# Patient Record
Sex: FEMALE | Race: BLACK OR AFRICAN AMERICAN | URBAN - METROPOLITAN AREA
[De-identification: names, ages, dates, MRNs, and addresses within clinical notes are randomized per-mention and may not be internally consistent; named-entity substitution may affect disease eponyms.]

---

## 2017-11-16 ENCOUNTER — RESULT FOLLOW UP (OUTPATIENT)
Dept: OBGYN | Facility: OTHER | Age: 32
End: 2017-11-16

## 2017-11-16 ENCOUNTER — PRENATAL OFFICE VISIT (OUTPATIENT)
Dept: OBGYN | Facility: OTHER | Age: 32
End: 2017-11-16

## 2017-11-16 VITALS
HEART RATE: 84 BPM | BODY MASS INDEX: 48.19 KG/M2 | HEIGHT: 65 IN | WEIGHT: 289.25 LBS | SYSTOLIC BLOOD PRESSURE: 112 MMHG | DIASTOLIC BLOOD PRESSURE: 68 MMHG

## 2017-11-16 DIAGNOSIS — O30.042 DICHORIONIC DIAMNIOTIC TWIN PREGNANCY IN SECOND TRIMESTER: ICD-10-CM

## 2017-11-16 DIAGNOSIS — Z23 NEED FOR TDAP VACCINATION: ICD-10-CM

## 2017-11-16 DIAGNOSIS — O09.92 SUPERVISION OF HIGH RISK PREGNANCY IN SECOND TRIMESTER: Primary | ICD-10-CM

## 2017-11-16 DIAGNOSIS — R87.810 CERVICAL HIGH RISK HPV (HUMAN PAPILLOMAVIRUS) TEST POSITIVE: ICD-10-CM

## 2017-11-16 DIAGNOSIS — Z12.4 SCREENING FOR MALIGNANT NEOPLASM OF CERVIX: ICD-10-CM

## 2017-11-16 DIAGNOSIS — Z98.891 HISTORY OF 3 CESAREAN SECTIONS: ICD-10-CM

## 2017-11-16 LAB
ABO + RH BLD: NORMAL
ABO + RH BLD: NORMAL
ALBUMIN UR-MCNC: NEGATIVE MG/DL
APPEARANCE UR: ABNORMAL
BILIRUB UR QL STRIP: NEGATIVE
BLD GP AB SCN SERPL QL: NORMAL
BLOOD BANK CMNT PATIENT-IMP: NORMAL
COLOR UR AUTO: YELLOW
ERYTHROCYTE [DISTWIDTH] IN BLOOD BY AUTOMATED COUNT: 15.1 % (ref 10–15)
GLUCOSE UR STRIP-MCNC: NEGATIVE MG/DL
HCT VFR BLD AUTO: 32.6 % (ref 35–47)
HGB BLD-MCNC: 10.9 G/DL (ref 11.7–15.7)
HGB UR QL STRIP: ABNORMAL
KETONES UR STRIP-MCNC: 15 MG/DL
LEUKOCYTE ESTERASE UR QL STRIP: NEGATIVE
MCH RBC QN AUTO: 27.6 PG (ref 26.5–33)
MCHC RBC AUTO-ENTMCNC: 33.4 G/DL (ref 31.5–36.5)
MCV RBC AUTO: 83 FL (ref 78–100)
NITRATE UR QL: NEGATIVE
PH UR STRIP: 6.5 PH (ref 5–7)
PLATELET # BLD AUTO: 284 10E9/L (ref 150–450)
RBC # BLD AUTO: 3.95 10E12/L (ref 3.8–5.2)
SOURCE: ABNORMAL
SP GR UR STRIP: 1.02 (ref 1–1.03)
SPECIMEN EXP DATE BLD: NORMAL
UROBILINOGEN UR STRIP-ACNC: 2 EU/DL (ref 0.2–1)
WBC # BLD AUTO: 9.9 10E9/L (ref 4–11)

## 2017-11-16 PROCEDURE — 86780 TREPONEMA PALLIDUM: CPT | Performed by: ADVANCED PRACTICE MIDWIFE

## 2017-11-16 PROCEDURE — 99207 ZZC FIRST OB VISIT: CPT | Performed by: ADVANCED PRACTICE MIDWIFE

## 2017-11-16 PROCEDURE — 87340 HEPATITIS B SURFACE AG IA: CPT | Performed by: ADVANCED PRACTICE MIDWIFE

## 2017-11-16 PROCEDURE — 87591 N.GONORRHOEAE DNA AMP PROB: CPT | Performed by: ADVANCED PRACTICE MIDWIFE

## 2017-11-16 PROCEDURE — 85027 COMPLETE CBC AUTOMATED: CPT | Performed by: ADVANCED PRACTICE MIDWIFE

## 2017-11-16 PROCEDURE — 86762 RUBELLA ANTIBODY: CPT | Performed by: ADVANCED PRACTICE MIDWIFE

## 2017-11-16 PROCEDURE — 86850 RBC ANTIBODY SCREEN: CPT | Performed by: ADVANCED PRACTICE MIDWIFE

## 2017-11-16 PROCEDURE — 87624 HPV HI-RISK TYP POOLED RSLT: CPT | Performed by: ADVANCED PRACTICE MIDWIFE

## 2017-11-16 PROCEDURE — 36415 COLL VENOUS BLD VENIPUNCTURE: CPT | Performed by: ADVANCED PRACTICE MIDWIFE

## 2017-11-16 PROCEDURE — 87389 HIV-1 AG W/HIV-1&-2 AB AG IA: CPT | Performed by: ADVANCED PRACTICE MIDWIFE

## 2017-11-16 PROCEDURE — 86900 BLOOD TYPING SEROLOGIC ABO: CPT | Performed by: ADVANCED PRACTICE MIDWIFE

## 2017-11-16 PROCEDURE — 87086 URINE CULTURE/COLONY COUNT: CPT | Performed by: ADVANCED PRACTICE MIDWIFE

## 2017-11-16 PROCEDURE — 86901 BLOOD TYPING SEROLOGIC RH(D): CPT | Performed by: ADVANCED PRACTICE MIDWIFE

## 2017-11-16 PROCEDURE — 87491 CHLMYD TRACH DNA AMP PROBE: CPT | Performed by: ADVANCED PRACTICE MIDWIFE

## 2017-11-16 PROCEDURE — 81003 URINALYSIS AUTO W/O SCOPE: CPT | Performed by: ADVANCED PRACTICE MIDWIFE

## 2017-11-16 PROCEDURE — G0145 SCR C/V CYTO,THINLAYER,RESCR: HCPCS | Performed by: ADVANCED PRACTICE MIDWIFE

## 2017-11-16 RX ORDER — PRENATAL VIT/IRON FUM/FOLIC AC 27MG-0.8MG
1 TABLET ORAL DAILY
COMMUNITY

## 2017-11-16 ASSESSMENT — ANXIETY QUESTIONNAIRES
2. NOT BEING ABLE TO STOP OR CONTROL WORRYING: NOT AT ALL
IF YOU CHECKED OFF ANY PROBLEMS ON THIS QUESTIONNAIRE, HOW DIFFICULT HAVE THESE PROBLEMS MADE IT FOR YOU TO DO YOUR WORK, TAKE CARE OF THINGS AT HOME, OR GET ALONG WITH OTHER PEOPLE: NOT DIFFICULT AT ALL
GAD7 TOTAL SCORE: 0
3. WORRYING TOO MUCH ABOUT DIFFERENT THINGS: NOT AT ALL
7. FEELING AFRAID AS IF SOMETHING AWFUL MIGHT HAPPEN: NOT AT ALL
1. FEELING NERVOUS, ANXIOUS, OR ON EDGE: NOT AT ALL
5. BEING SO RESTLESS THAT IT IS HARD TO SIT STILL: NOT AT ALL
6. BECOMING EASILY ANNOYED OR IRRITABLE: NOT AT ALL

## 2017-11-16 ASSESSMENT — PATIENT HEALTH QUESTIONNAIRE - PHQ9
SUM OF ALL RESPONSES TO PHQ QUESTIONS 1-9: 0
5. POOR APPETITE OR OVEREATING: NOT AT ALL

## 2017-11-16 NOTE — LETTER
April 4, 2018      Blanca Bello  [address] [facility] [address]    Dear MsLennyEugenia,      At [facility], your health and wellness is our primary concern. That is why we are following up on a positive high risk HPV test from 11/14/17, which was reported as HPV 18. Your provider had recommended that you have a Colposcopy completed post partum. Our records do not show that this has been scheduled.    It is important to complete the follow up that your provider has suggested for you to ensure that there are no worsening changes which may, over time, develop into cancer.      Please contact our office at  [phone] to schedule an appointment for a Colposcopy at your earliest convenience. If you have questions or concerns, please call the clinic and we will be happy to assist you.    If you have completed the tests outside of [facility], please have the results forwarded to our office. We will update the chart for your primary Physician to review before your next annual physical.     Thank you for choosing [facility]!    Sincerely,      JERRI Castellano CNM/marline

## 2017-11-16 NOTE — MR AVS SNAPSHOT
After Visit Summary   2017    Blanca Bello    MRN: [id number]           Patient Information     Date Of Birth          1985        Visit Information        Provider Department      2017 4:00 PM Stephie Solis APRN CNM [facility]        Today's Diagnoses     Supervision of high risk pregnancy in second trimester    -  1    Need for Tdap vaccination        Dichorionic diamniotic twin pregnancy in second trimester        History of 3  sections        Screening for malignant neoplasm of cervix           Follow-ups after your visit        Additional Services     PERINATOLOGY REFERRAL       Your provider has referred you to Perinatology Services (please use [facility] Referral if referring to [facility]).    Please be aware that coverage of these services is subject to the terms and limitations of your health insurance plan.  Call member services at your health plan with any benefit or coverage questions.      Please bring the following with you to your appointment:    (1) Any X-Rays, CTs or MRIs which have been performed.  Contact the facility where they were done to arrange for  prior to your scheduled appointment.  Any new CT, MRI or other procedures ordered by your specialist must be performed at a [place] facility or coordinated by your clinic's referral office.    (2) List of current medications   (3) This referral request   (4) Any documents/labs given to you for this referral                  Future tests that were ordered for you today     Open Future Orders        Priority Expected Expires Ordered    Glucose tolerance gest screen 1 hour Routine  2017            Who to contact     If you have questions or need follow up information about today's clinic visit or your schedule please contact [facility] directly at [phone].  Normal or non-critical lab and imaging results will be  "communicated to you by MyChart, letter or phone within 4 business days after the clinic has received the results. If you do not hear from us within 7 days, please contact the clinic through PageLever[employer] or phone. If you have a critical or abnormal lab result, we will notify you by phone as soon as possible.  Submit refill requests through [employer] or call your pharmacy and they will forward the refill request to us. Please allow 3 business days for your refill to be completed.          Additional Information About Your Visit        TÃ¡ximo Information     T[employer]o lets you send messages to your doctor, view your test results, renew your prescriptions, schedule appointments and more. To sign up, go to [email or web address] . Click on \"Log in\" on the left side of the screen, which will take you to the Welcome page. Then click on \"Sign up Now\" on the right side of the page.     You will be asked to enter the access code listed below, as well as some personal information. Please follow the directions to create your username and password.     Your access code is: [email or web address]  Expires: 2018  6:21 PM     Your access code will  in 90 days. If you need help or a new code, please call your [place] clinic or [phone].        Care EveryWhere ID     This is your Care EveryWhere ID. This could be used by other organizations to access your [place] medical records  [id number]        Your Vitals Were     Pulse Height BMI (Body Mass Index)             84 5' 4.75\" (1.645 m) 48.51 kg/m2          Blood Pressure from Last 3 Encounters:   17 112/68    Weight from Last 3 Encounters:   17 289 lb 4 oz (131.2 kg)              We Performed the Following     ABO/Rh type and screen     Anti Treponema     CBC with platelets     Chlamydia trachomatis PCR     Hepatitis B surface antigen     HIV Antigen Antibody Combo     HPV High Risk Types DNA Cervical     Neisseria gonorrhoeae PCR     Pap imaged thin layer screen with " HPV - recommended age 30 - 65 years (select HPV order below)     PERINATOLOGY REFERRAL     Rubella Antibody IgG Quantitative     UA without Microscopic     Urine Culture Aerobic Bacterial        Primary Care Provider Office Phone # Fax #    [facility] [phone]       [address] [place] [address]        Equal Access to Services     ALFONSO GIANG : Hadii aad ku hadasho Soomaali, waaxda luqadaha, qaybta kaalmada adeegyada, waxmiguel greene. So [facility] [phone].    ATENCIÓN: Si habla español, tiene a newman disposición servicios gratuitos de asistencia lingüística. Llame al [phone].    We comply with applicable federal civil rights laws and [place] laws. We do not discriminate on the basis of race, color, national origin, age, disability, sex, sexual orientation, or gender identity.            Thank you!     Thank you for choosing [facility]  for your care. Our goal is always to provide you with excellent care. Hearing back from our patients is one way we can continue to improve our services. Please take a few minutes to complete the written survey that you may receive in the mail after your visit with us. Thank you!             Your Updated Medication List - Protect others around you: Learn how to safely use, store and throw away your medicines at www.disposemymeds.org.          This list is accurate as of: 11/16/17  6:21 PM.  Always use your most recent med list.                   Brand Name Dispense Instructions for use Diagnosis    prenatal multivitamin plus iron 27-0.8 MG Tabs per tablet      Take 1 tablet by mouth daily

## 2017-11-16 NOTE — PROGRESS NOTES
Blanca Bello is a 32 year old  who presents to the clinic for an new ob visit.   Estimated Date of Delivery: Mar 1, 2018 is calculated from No LMP recorded. Patient is pregnant.   Her record is complicated by her use of another name to start her prenatal care.  She had warrants out for her arrest and was concerned about being arrested.   Understands that the records under her sister's name are not usable.  She got care at [facility].  Was diagnosed with DI/DI twins and had a 20 week scan.   She may be getting out and be under house arrest and in that case would deliver at [place].       She has not had bleeding since her LMP.   She has had mild nausea. Weigh loss has not occurred.     HISTORY  updated and reviewed  Past Medical History:   Diagnosis Date     NO ACTIVE PROBLEMS      Past Surgical History:   Procedure Laterality Date     GYN SURGERY       x3     Social History     Social History     Marital status: Single     Spouse name: N/A     Number of children: N/A     Years of education: N/A     Occupational History     Not on file.     Social History Main Topics     Smoking status: Never Smoker     Smokeless tobacco: Never Used     Alcohol use No     Drug use: No     Sexual activity: Yes     Partners: Male     Birth control/ protection: None     Other Topics Concern     Not on file     Social History Narrative     Health Maintenance   Topic Date Due     TETANUS IMMUNIZATION (SYSTEM ASSIGNED)  01/15/2002     PAP SCREENING Q3 YR (SYSTEM ASSIGNED)  01/15/2005     INFLUENZA VACCINE (SYSTEM ASSIGNED)  2017     Family History   Problem Relation Age of Onset     Coronary Artery Disease Father      DIABETES Father      Hyperlipidemia Father      DIABETES Maternal Grandmother      Coronary Artery Disease Maternal Grandmother      Hyperlipidemia Maternal Grandmother            REVIEW OF SYSTEMS  C: NEGATIVE for fever, chills  I: NEGATIVE for worrisome rashes, moles or lesions  E: NEGATIVE  "for vision changes   E/M: NEGATIVE for ear, mouth and throat problems  R: NEGATIVE for significant cough or SOB  B: NEGATIVE for masses, tenderness or discharge  CV: NEGATIVE for chest pain, palpitations   GI: NEGATIVE for nausea, abdominal pain, heartburn, or change in bowel habits  : NEGATIVE for frequency, dysuria, or hematuria  M: NEGATIVE for significant arthralgias or myalgia  N: NEGATIVE for weakness, dizziness or paresthesias or headache  E: NEGATIVE for temperature intolerance, skin/hair changes  H: NEGATIVE for bleeding problems  P: NEGATIVE for changes in mood or affect        PHYSICAL EXAM  /68 (BP Location: Left arm, Patient Position: Chair, Cuff Size: Adult Large)  Pulse 84  Ht 5' 4.75\" (1.645 m)  Wt 289 lb 4 oz (131.2 kg)  BMI 48.51 kg/m2  GENERAL:  Pleasant pregnant female, alert, cooperative  and well groomed.  SKIN:  Warm and dry, without lesions or rashes  HEAD: Symmetrical features.  EYES:  PERRLA.  EARS: Tympanic membranes gray, translucent and intact.  MOUTH:  Buccal mucosa pink, moist without lesions.  Teeth in fair repair.    NECK:  Thyroid without enlargement and nodules.  Lymph nodes not palpable.   LUNGS:  Clear to auscultation.  BREAST:  Symmetrical.  No dominant, fixed or suspicious masses are noted.  No skin or nipple changes or axillary nodes.    Nipples everted.      HEART:  RRR with  out murmur.   ABDOMEN: Soft without masses , tenderness or organomegaly.  No CVA tenderness.  Uterus palpable at size equal to dates.  Well healed scar from  section.  MUSCULOSKELETAL:  Full range of motion  GENITALIA: EGBUS normal. Vulva reveals no erythema or lesions.       VAGINA:  pink, normal ruga and discharge, no lesions.        CERVIX:  smooth, without discharge or CMT .                  EXTREMITIES:  No edema. No significant varicosities.    ASSESSMENT:  Intrauterine pregnancy of 25w0d  (O09.92) Supervision of high risk pregnancy in second trimester  (primary encounter " diagnosis)  Comment:   Plan: UA without Microscopic, Urine Culture Aerobic         Bacterial, CBC with platelets, HIV Antigen         Antibody Combo, Rubella Antibody IgG         Quantitative, Hepatitis B surface antigen, Anti        Treponema, ABO/Rh type and screen, Chlamydia         trachomatis PCR, Neisseria gonorrhoeae PCR,         Glucose tolerance gest screen 1 hour,         PERINATOLOGY REFERRAL            (Z23) Need for Tdap vaccination  Comment:   Plan:     (O30.042) Dichorionic diamniotic twin pregnancy in second trimester  Comment:   Plan:     (Z87.59) History of 3  sections  Comment:   Plan:       PLAN:      Instructed on best evidence for: weight gain for her weight and height for pregnancy; healthy diet and foods to avoid; exercise and activity during pregnancy;avoiding exposure to toxoplasmosis; and maintenance of a generally healthy lifestyle.     Intended hospital [facility] birth is [facility]  Reviewed transmission of and avoidance strategies for CMV.    Will plan to [facility] G[facility] at the [facility] and asked them to send us the results.   Will plan [facility] ultrasound and growth scans  Will plan next visit with Dr Felder or Alberto.           Genetic Screening  At the time of birth, will you be 35 years old or older?  No  Has the patient, baby s father, or anyone in either family had:  Thalassemia (Italian, Greek, Mediterranean, or  background only) and an MCV result less than 80?  No  Neural tube defect such as meningomyelocele, spina bifida or anencephaly? No  Congenital heart defect? No  Down s syndrome?  No  Lauro-Sach s disease (Jain, Cajun, Greek-Turks and Caicos Islander)? No  Sickle cell disease or trait (Maranda)?  No  Muscular dystrophy?  No  Cystic Fibrosis?  No  [place] s chorea? No  Mental retardation/autism?  No   If yes, was the person tested for fragile X?  No  Any other inherited genetic or chromosomal disorder? No  Maternal metabolic disorder (e.g. insulin-dependent diabetes, PKU)?  No  A child with  birth defects not listed above? No  Recurrent pregnancy loss or a stillbirth?  No  Does the patient or baby s father have any other genetic risks? No  Infection History  Do we have your permission to complete routine prenatal lab tests.  This includes Hepatitis B, HIV? Yes:   Do you feel that you are at high risk for coming in contact with the AIDS virus? No  Have you ever been treated for tuberculosis?  No  Have you ever received the BCG vaccine for tuberculosis? No  Do you live with someone who has tuberculosis? No   Have you ever been exposed to tuberculosis?  No  Do you have genital herpes?  No  Does your partner have genital herpes?  No  Have you had a rash or viral illness since your last period?  No  Have you ever had Gonorrhea, Chlamydia, Syphilis, venereal warts, trichomoniasis, pelvic inflammatory disease or any other sexually transmitted disease?  No  Have you had chicken pox?  No  Have you been vaccinated against chicken pox?  Yes:     Have you had any other infectious disease?  No

## 2017-11-16 NOTE — LETTER
August 15, 2018      Blanca Eugenia  [address] [address]    Dear ,      At [facility], your health and wellness is our primary concern. That is why we are following up on a positive high risk HPV test from 11/14/17, which was reported as HPV 18. Your provider had recommended that you have a Colposcopy completed post partum. Our records do not show that this has been scheduled.     It is important to complete the follow up that your provider has suggested for you to ensure that there are no worsening changes which may, over time, develop into cancer.      If you have chosen not to do the recommended colposcopy, please contact our office at [phone] to schedule an appointment for a repeat PAP smear and HPV test at your earliest convenience.    If you have completed the tests outside of [facility], please have the results forwarded to our office. We will update the chart for your primary Physician to review before your next annual physical.     Thank you for choosing [facility]!    Sincerely,      JERRI Castellano CNM/marline

## 2017-11-16 NOTE — NURSING NOTE
"Chief Complaint   Patient presents with     Prenatal Care       Initial /68 (BP Location: Left arm, Patient Position: Chair, Cuff Size: Adult Large)  Pulse 84  Ht 5' 4.75\" (1.645 m)  Wt 289 lb 4 oz (131.2 kg)  BMI 48.51 kg/m2 Estimated body mass index is 48.51 kg/(m^2) as calculated from the following:    Height as of this encounter: 5' 4.75\" (1.645 m).    Weight as of this encounter: 289 lb 4 oz (131.2 kg).  Medication Reconciliation: molina Crowder CMA      "

## 2017-11-17 ASSESSMENT — ANXIETY QUESTIONNAIRES: GAD7 TOTAL SCORE: 0

## 2017-11-18 LAB
BACTERIA SPEC CULT: NORMAL
Lab: NORMAL
SPECIMEN SOURCE: NORMAL

## 2017-11-19 LAB
C TRACH DNA SPEC QL NAA+PROBE: NEGATIVE
N GONORRHOEA DNA SPEC QL NAA+PROBE: NEGATIVE
SPECIMEN SOURCE: NORMAL
SPECIMEN SOURCE: NORMAL

## 2017-11-20 LAB
HBV SURFACE AG SERPL QL IA: NONREACTIVE
HIV 1+2 AB+HIV1 P24 AG SERPL QL IA: NONREACTIVE
RUBV IGG SERPL IA-ACNC: 12 IU/ML

## 2017-11-21 LAB
COPATH REPORT: NORMAL
PAP: NORMAL
T PALLIDUM IGG+IGM SER QL: NEGATIVE

## 2017-11-22 LAB
FINAL DIAGNOSIS: ABNORMAL
HPV HR 12 DNA CVX QL NAA+PROBE: NEGATIVE
HPV16 DNA SPEC QL NAA+PROBE: NEGATIVE
HPV18 DNA SPEC QL NAA+PROBE: POSITIVE
SPECIMEN DESCRIPTION: ABNORMAL

## 2017-12-01 ENCOUNTER — TRANSFERRED RECORDS (OUTPATIENT)
Dept: HEALTH INFORMATION MANAGEMENT | Facility: CLINIC | Age: 32
End: 2017-12-01

## 2017-12-01 LAB — GLUCOSE, 1 HR. OB: 141 MG/DL (ref 60–139)

## 2017-12-04 NOTE — PROGRESS NOTES
"11/16/17 NIL pap, + HR HPV 18 (not 16 other). 26w1d. Plan: [employer] postpartum. Per visit note, currently inmate at [facility]. May be getting out soon and will then be under house arrest and would deliver at [place], [place]. Estimated Date of Delivery: Mar 1, 2018  12/4/17 Spoke to [facility] clinic  \"Jeanette\". RN is not able to talk to patient. [facility]. The doctor at the [facility] will advise patient of her results and the recommendations for follow up.   2/12/18 Visit with Dr. Dominguez. Provider discussed need for colp with patient (see pap result note, \"discussed\" response from Dr. Dominguez)  2/27/18 patient delivered twins.  4/4/18 [employer] reminder letter sent (rlm)  4/27/18 Reminder call - patient is in half-way house. Consent to communicate with María Costa, [phone], dated 1/8/18.  Spoke to María. María will contact patient and have her schedule her colposcopy. ([facility])  5/18/18 colp visit - no show.  6/12/18 [employer] not done. Tracking updated for 6 mo colp/pap due 8/27/18. FYI sent to provider. ([facility])  8/15/18 [employer]/Pap reminder letter sent (rlm)  02/25/19 [facility] clinic and schedule. ([facility])  3/26/19 This pt is lost to follow-up for pap tracking. Result follow-up encounter has been sent to provider as an FYI.   "

## 2017-12-08 ENCOUNTER — TRANSFERRED RECORDS (OUTPATIENT)
Dept: HEALTH INFORMATION MANAGEMENT | Facility: CLINIC | Age: 32
End: 2017-12-08

## 2017-12-11 ENCOUNTER — TELEPHONE (OUTPATIENT)
Dept: OBGYN | Facility: OTHER | Age: 32
End: 2017-12-11

## 2017-12-11 DIAGNOSIS — R73.09 OTHER ABNORMAL GLUCOSE: Primary | ICD-10-CM

## 2017-12-11 NOTE — TELEPHONE ENCOUNTER
Please see other encounter regarding abnormal GCT.  Patient is scheduled to see me next week, so she can do the 3 hr test at that time if possible.  She should come in fasting.

## 2017-12-11 NOTE — TELEPHONE ENCOUNTER
Called [facility] and spoke to medical staff and informed them of patient needing to do 3 hr test.  Informed them that she would need to fast for at least 8 hrs prior to the test and she can have it done at her appointment on the 18th.  They will be coming around 12pm to get her started on her 3 hr test.    Parvin Winters, [facility]  December 11, 2017

## 2017-12-11 NOTE — PROGRESS NOTES
[facility] [employer]    GCT collected 12/1/17 - 141.    Recommend 3 hr glucose tolerance.  This was ordered.  Please contact the assisted to notify the patient.

## 2017-12-18 ENCOUNTER — PRENATAL OFFICE VISIT (OUTPATIENT)
Dept: OBGYN | Facility: OTHER | Age: 32
End: 2017-12-18
Payer: COMMERCIAL

## 2017-12-18 VITALS
HEART RATE: 88 BPM | WEIGHT: 293 LBS | DIASTOLIC BLOOD PRESSURE: 70 MMHG | BODY MASS INDEX: 49.18 KG/M2 | SYSTOLIC BLOOD PRESSURE: 110 MMHG

## 2017-12-18 DIAGNOSIS — Z23 NEED FOR TDAP VACCINATION: ICD-10-CM

## 2017-12-18 DIAGNOSIS — O09.92 SUPERVISION OF HIGH RISK PREGNANCY IN SECOND TRIMESTER: ICD-10-CM

## 2017-12-18 DIAGNOSIS — O30.043 DICHORIONIC DIAMNIOTIC TWIN PREGNANCY IN THIRD TRIMESTER: Primary | ICD-10-CM

## 2017-12-18 DIAGNOSIS — Z98.891 HISTORY OF 3 CESAREAN SECTIONS: ICD-10-CM

## 2017-12-18 PROCEDURE — 99207 ZZC COMPLICATED OB VISIT: CPT | Performed by: OBSTETRICS & GYNECOLOGY

## 2017-12-18 NOTE — NURSING NOTE
"Chief Complaint   Patient presents with     Prenatal Care       Initial /70 (BP Location: Right arm, Patient Position: Chair, Cuff Size: Adult Regular)  Pulse 88  Wt 293 lb 4 oz (133 kg)  BMI 49.18 kg/m2 Estimated body mass index is 49.18 kg/(m^2) as calculated from the following:    Height as of 11/16/17: 5' 4.75\" (1.645 m).    Weight as of this encounter: 293 lb 4 oz (133 kg).  Medication Reconciliation: complete     Parvin Winters, [facility]  December 18, 2017      "

## 2017-12-18 NOTE — MR AVS SNAPSHOT
"              After Visit Summary   2017    Blanca Bello    MRN: [id number]           Patient Information     Date Of Birth          1985        Visit Information        Provider Department      2017 3:00 PM Sandra Dominguez MD [facility]        Today's Diagnoses     Dichorionic diamniotic twin pregnancy in third trimester    -  1    Need for Tdap vaccination        History of 3  sections        Supervision of high risk pregnancy in second trimester          Care Instructions      Please make sure the patient is scheduled for her follow up ultrasound with maternal fetal medicine.      Patient needs a 3 hour glucose tolerance test.  This is a fasting test.  She should come in to the clinic for this within the next week.            Follow-ups after your visit        Follow-up notes from your care team     Return in about 2 weeks (around 2018) for OB Visit.      Who to contact     If you have questions or need follow up information about today's clinic visit or your schedule please contact [facility] directly at [phone].  Normal or non-critical lab and imaging results will be communicated to you by Viva Vision[employer], letter or phone within 4 business days after the clinic has received the results. If you do not hear from us within 7 days, please contact the clinic through Control4t or phone. If you have a critical or abnormal lab result, we will notify you by phone as soon as possible.  Submit refill requests through [employer] or call your pharmacy and they will forward the refill request to us. Please allow 3 business days for your refill to be completed.          Additional Information About Your Visit        MyChart Information     [employer] lets you send messages to your doctor, view your test results, renew your prescriptions, schedule appointments and more. To sign up, go to [email or web address] . Click on \"Log in\" on the left side of the screen, which " "will take you to the Welcome page. Then click on \"Sign up Now\" on the right side of the page.     You will be asked to enter the access code listed below, as well as some personal information. Please follow the directions to create your username and password.     Your access code is: [email or web address]  Expires: 2018  6:21 PM     Your access code will  in 90 days. If you need help or a new code, please call your [place] clinic or [phone].        Care EveryWhere ID     This is your Care EveryWhere ID. This could be used by other organizations to access your [facility] medical records  [id number]        Your Vitals Were     Pulse BMI (Body Mass Index)                88 49.18 kg/m2           Blood Pressure from Last 3 Encounters:   17 110/70   17 112/68    Weight from Last 3 Encounters:   17 293 lb 4 oz (133 kg)   17 289 lb 4 oz (131.2 kg)              Today, you had the following     No orders found for display       Primary Care Provider Office Phone # Fax #    [facility] [phone]       [address] [place] [address]        Equal Access to Services     ALFONSO GIANG AH: Hadii traci gregory hadasho Soomaali, waaxda luqadaha, qaybta kaalmada adeegyada, zia greene. So [facility] [phone].    ATENCIÓN: Si habla español, tiene a newman disposición servicios gratuitos de asistencia lingüística. Llame al [phone].    We comply with applicable federal civil rights laws and [place] laws. We do not discriminate on the basis of race, color, national origin, age, disability, sex, sexual orientation, or gender identity.            Thank you!     Thank you for choosing [facility]  for your care. Our goal is always to provide you with excellent care. Hearing back from our patients is one way we can continue to improve our services. Please take a few minutes to complete the written survey that you may receive in the mail " after your visit with us. Thank you!             Your Updated Medication List - Protect others around you: Learn how to safely use, store and throw away your medicines at www.disposemymeds.org.          This list is accurate as of: 12/18/17  3:13 PM.  Always use your most recent med list.                   Brand Name Dispense Instructions for use Diagnosis    prenatal multivitamin plus iron 27-0.8 MG Tabs per tablet      Take 1 tablet by mouth daily

## 2017-12-18 NOTE — PATIENT INSTRUCTIONS
Please make sure the patient is scheduled for her follow up ultrasound with maternal fetal medicine.      Patient needs a 3 hour glucose tolerance test.  This is a fasting test.  She should come in to the clinic for this within the next week.

## 2017-12-18 NOTE — PROGRESS NOTES
Presents for routine  appointment. Her record is complicated by her use of another name to start her prenatal care.      Cold symptoms.  Coughing up some mucous.    No LOF/VB/Ctxs.    ROS:   and GI  negative.     Please see Prenatal Vitals and Notes Flowsheet for objective data.    Exam:  Heart:  RRR  Lungs:  CTAB    US with [facility] 17: Di/Di twins, presenting twin is on the right, breech.  Other twin is on maternal left, breech.  Normal Anatomy screen.  AC of twin A is just above the 5%.      A/P:  32 year old  at 29w4d       ICD-10-CM    1. Dichorionic diamniotic twin pregnancy in third trimester O30.043    2. Need for Tdap vaccination Z23    3. History of 3  sections Z87.59    4. Supervision of high risk pregnancy in second trimester O09.92        GCT Abnormal:  - she was supposed to come in fasting for her 3 hr test today.  She will need to get that scheduled.    TDAP next visit.    Rhogam: not  needed  Plan repeat  at 38 weeks, sooner as needed.  Will schedule at her next visit  Follow up with [facility] next week for growth US  Follow up in 2 weeks.      Sandra Dominguez MD

## 2018-01-03 ENCOUNTER — TRANSFERRED RECORDS (OUTPATIENT)
Dept: HEALTH INFORMATION MANAGEMENT | Facility: CLINIC | Age: 33
End: 2018-01-03

## 2018-01-03 LAB
GLUCOSE P FAST SERPL-MCNC: 91 MG/DL
GTT-1HR-SERUM: 146 MG/DL
GTT-2HR-SERUM: 113 MG/DL
GTT-3HR-SERUM: 80 MG/DL

## 2018-01-05 DIAGNOSIS — R73.09 OTHER ABNORMAL GLUCOSE: ICD-10-CM

## 2018-01-05 DIAGNOSIS — Z53.9 DIAGNOSIS NOT YET DEFINED: Primary | ICD-10-CM

## 2018-01-05 DIAGNOSIS — O09.92 SUPERVISION OF HIGH RISK PREGNANCY IN SECOND TRIMESTER: ICD-10-CM

## 2018-01-05 NOTE — PROGRESS NOTES
Component      Latest Ref Rng & Units 12/1/2017 1/3/2018   GTT Fasting      <95 mg/dL  91   GTT 1 Hr      <180 mg/dL  146   GTT 2 Hr      <155 mg/dL  113   GTT 3 Hr      <140 mg/dL  80   Glucose 1 Hr OB      60 - 139 mg/dL 141 (A)

## 2018-01-08 ENCOUNTER — PRENATAL OFFICE VISIT (OUTPATIENT)
Dept: OBGYN | Facility: OTHER | Age: 33
End: 2018-01-08

## 2018-01-08 VITALS
DIASTOLIC BLOOD PRESSURE: 80 MMHG | SYSTOLIC BLOOD PRESSURE: 110 MMHG | HEART RATE: 80 BPM | WEIGHT: 291.5 LBS | BODY MASS INDEX: 48.88 KG/M2

## 2018-01-08 DIAGNOSIS — O30.043 DICHORIONIC DIAMNIOTIC TWIN PREGNANCY IN THIRD TRIMESTER: Primary | ICD-10-CM

## 2018-01-08 DIAGNOSIS — Z98.891 HISTORY OF 3 CESAREAN SECTIONS: ICD-10-CM

## 2018-01-08 DIAGNOSIS — Z23 NEED FOR TDAP VACCINATION: ICD-10-CM

## 2018-01-08 PROCEDURE — 90715 TDAP VACCINE 7 YRS/> IM: CPT | Performed by: OBSTETRICS & GYNECOLOGY

## 2018-01-08 PROCEDURE — 90471 IMMUNIZATION ADMIN: CPT | Performed by: OBSTETRICS & GYNECOLOGY

## 2018-01-08 PROCEDURE — 99207 ZZC COMPLICATED OB VISIT: CPT | Performed by: OBSTETRICS & GYNECOLOGY

## 2018-01-08 NOTE — NURSING NOTE
Screening Questionnaire for Adult Immunization    Are you sick today?   No   Do you have allergies to medications, food, a vaccine component or latex?   No   Have you ever had a serious reaction after receiving a vaccination?   No   Do you have a long-term health problem with heart disease, lung disease, asthma, kidney disease, metabolic disease (e.g. diabetes), anemia, or other blood disorder?   No   Do you have cancer, leukemia, HIV/AIDS, or any other immune system problem?   No   In the past 3 months, have you taken medications that affect  your immune system, such as prednisone, other steroids, or anticancer drugs; drugs for the treatment of rheumatoid arthritis, Crohn s disease, or psoriasis; or have you had radiation treatments?   No   Have you had a seizure, or a brain or other nervous system problem?   No   During the past year, have you received a transfusion of blood or blood     products, or been given immune (gamma) globulin or antiviral drug?   No   For women: Are you pregnant or is there a chance you could become        pregnant during the next month?   Yes   Have you received any vaccinations in the past 4 weeks?   No     Immunization questionnaire was positive for at least one answer.  Notified Dr. Dominguez.        Per orders of Dr. Dominguez, injection of Tdap given by Parvin Winters. Patient instructed to remain in clinic for 15 minutes afterwards, and to report any adverse reaction to me immediately.       Screening performed by Parvin Winters on 1/8/2018 at 4:29 PM.

## 2018-01-08 NOTE — NURSING NOTE
"Chief Complaint   Patient presents with     Prenatal Care       Initial /80 (BP Location: Right arm, Patient Position: Chair, Cuff Size: Adult Regular)  Pulse 80  Wt 291 lb 8 oz (132.2 kg)  BMI 48.88 kg/m2 Estimated body mass index is 48.88 kg/(m^2) as calculated from the following:    Height as of 11/16/17: 5' 4.75\" (1.645 m).    Weight as of this encounter: 291 lb 8 oz (132.2 kg).  Medication Reconciliation: complete     Parvin Winters, [facility]  January 8, 2018      "

## 2018-01-08 NOTE — MR AVS SNAPSHOT
"              After Visit Summary   2018    Blanca Bello    MRN: [id number]           Patient Information     Date Of Birth          1985        Visit Information        Provider Department      2018 4:00 PM Sandra Dominguez MD [facility]        Today's Diagnoses     Dichorionic diamniotic twin pregnancy in third trimester    -  1    Need for Tdap vaccination        History of 3  sections           Follow-ups after your visit        Follow-up notes from your care team     Return in about 2 weeks (around 2018) for OB Visit.      Who to contact     If you have questions or need follow up information about today's clinic visit or your schedule please contact [facility] directly at [phone].  Normal or non-critical lab and imaging results will be communicated to you by MyChart, letter or phone within 4 business days after the clinic has received the results. If you do not hear from us within 7 days, please contact the clinic through [employer] or phone. If you have a critical or abnormal lab result, we will notify you by phone as soon as possible.  Submit refill requests through Alle[employer]ia or call your pharmacy and they will forward the refill request to us. Please allow 3 business days for your refill to be completed.          Additional Information About Your Visit        Strategy Storehart Information     Alleantia lets you send messages to your doctor, view your test results, renew your prescriptions, schedule appointments and more. To sign up, go to [email or web address] . Click on \"Log in\" on the left side of the screen, which will take you to the Welcome page. Then click on \"Sign up Now\" on the right side of the page.     You will be asked to enter the access code listed below, as well as some personal information. Please follow the directions to create your username and password.     Your access code is: [email or web address]  Expires: 2018  6:21 PM     Your access " code will  in 90 days. If you need help or a new code, please call your [place] clinic or [phone].        Care EveryWhere ID     This is your Care EveryWhere ID. This could be used by other organizations to access your [place] medical records  [id number]        Your Vitals Were     Pulse BMI (Body Mass Index)                80 48.88 kg/m2           Blood Pressure from Last 3 Encounters:   18 110/80   17 110/70   17 112/68    Weight from Last 3 Encounters:   18 291 lb 8 oz (132.2 kg)   17 293 lb 4 oz (133 kg)   17 289 lb 4 oz (131.2 kg)              We Performed the Following     ADMIN 1st VACCINE     TDAP VACCINE (ADACEL)        Primary Care Provider Office Phone # Fax #    [facility] [place] [facility] [phone]       [address] [facility] [address]        Equal Access to Services     ALFONSO GIANG : Hadii traci ku hadasho Soomaali, waaxda luqadaha, qaybta kaalmada adeegyada, waxay nayin hayanastasiia pichardo . So [facility] [phone].    ATENCIÓN: Si habla español, tiene a newman disposición servicios gratuitos de asistencia lingüística. Llame al [phone].    We comply with applicable federal civil rights laws and [place] laws. We do not discriminate on the basis of race, color, national origin, age, disability, sex, sexual orientation, or gender identity.            Thank you!     Thank you for choosing [facility]  for your care. Our goal is always to provide you with excellent care. Hearing back from our patients is one way we can continue to improve our services. Please take a few minutes to complete the written survey that you may receive in the mail after your visit with us. Thank you!             Your Updated Medication List - Protect others around you: Learn how to safely use, store and throw away your medicines at www.disposemymeds.org.          This list is accurate as of: 18  4:27 PM.  Always use your most recent  med list.                   Brand Name Dispense Instructions for use Diagnosis    prenatal multivitamin plus iron 27-0.8 MG Tabs per tablet      Take 1 tablet by mouth daily

## 2018-01-08 NOTE — PROGRESS NOTES
Presents for routine  appointment. Her record is complicated by her use of another will need to start her prenatal care.     No complaints.    No LOF/VB/Ctxs.    ROS:   and GI  negative.     Please see Prenatal Vitals and Notes Flowsheet for objective data.    3hr GTT done at [facility], normal.  scanned in. (91/146/113/80)    Ultrasound with [facility] 1/3/2018:    Twin A breech, presenting. Placenta anterior right lateral. EFW 35%    Twin B transverse with head to maternal right. Placenta posterior.  EFW  44%    Discord 7%    A/P:  32 year old  at 32w4d       ICD-10-CM    1. Dichorionic diamniotic twin pregnancy in third trimester O30.043    2. Need for Tdap vaccination Z23 TDAP VACCINE (ADACEL)     ADMIN 1st VACCINE   3. History of 3  sections Z87.59        She is scheduled for follow-up with [facility] end of January to evaluate growth and BPP.  Tdap today  Estimated Date of Delivery: Mar 17, 2018. This was adjusted today to correlate with the CASTRO by LMP that [facility] has been using.  She will be 38 weeks Saturday Mar 3.  We will work on scheduling her repeat CS   Discussed plans for contraception - she will be in retirement after she delivers and will not need it.    Follow up in 2 weeks.  [place] officer present      Sandra Dominguez MD

## 2018-01-09 ENCOUNTER — TELEPHONE (OUTPATIENT)
Dept: OBGYN | Facility: OTHER | Age: 33
End: 2018-01-09

## 2018-01-09 NOTE — TELEPHONE ENCOUNTER
Surgery Scheduled    Date of Surgery 18 Time of Surgery 9:30am  Procedure:  Section  Hospital/Surgical Facility: [facility] [place] [facility]  Surgeon: Dr Dominguez  Type of Anesthesia Anticipated: Spinal  Pre-Op: to be scheduled by nursing home 1 week prior to  with Dr Dominguez   Post-Op: to be scheduled by nursing home 6 weeks post partum with with Dr Dominguez  Pre-Certification -to be completed  Consent Signed -to be completed  Hospital Stay -yes inpatient procedure    Surgery Packet was mailed to [facility].  Instructed to arrive 1 1/2 hour(s) prior to surgery.       Suly Morales  Specialty    _________________________________________  Surgery Pre-Certification    Medical Record Number: [id number]  Blanca Bello  YOB: 1985   Phone: [phone] (home)   Primary Provider: [facility]    Reason for Admit:  Ob Procedure    Surgeon: Dr Dominguez  Surgical Procedure:  Section  ICD-9 Coded: O30.043 & Z87.59  Date of Surgery: 18  Consent signed? No    Date signed:   Hospital: [facility]  Inpatient- Length of stay:  3 days.    Requestor:  Taniya Morales     Location:  [facility]

## 2018-01-09 NOTE — TELEPHONE ENCOUNTER
Reason for Call:  Other     Detailed comments: María Florez pt  calling to speak with doctor Alberto. [facility] pt signed a release to consent  to speak with Alberto. Please contact María Florez at [phone]    Phone Number Patient can be reached at: Home number on file [phone] (home)    Best Time: ANY    Can we leave a detailed message on this number? YES    Call taken on 1/9/2018 at 1:51 PM by Mikayla Parks

## 2018-01-18 ENCOUNTER — TELEPHONE (OUTPATIENT)
Dept: OBGYN | Facility: OTHER | Age: 33
End: 2018-01-18

## 2018-01-22 ENCOUNTER — PRENATAL OFFICE VISIT (OUTPATIENT)
Dept: OBGYN | Facility: OTHER | Age: 33
End: 2018-01-22
Payer: COMMERCIAL

## 2018-01-22 ENCOUNTER — TELEPHONE (OUTPATIENT)
Dept: OBGYN | Facility: OTHER | Age: 33
End: 2018-01-22

## 2018-01-22 VITALS
BODY MASS INDEX: 48.38 KG/M2 | SYSTOLIC BLOOD PRESSURE: 130 MMHG | HEART RATE: 70 BPM | DIASTOLIC BLOOD PRESSURE: 80 MMHG | WEIGHT: 288.5 LBS

## 2018-01-22 DIAGNOSIS — O30.043 DICHORIONIC DIAMNIOTIC TWIN PREGNANCY IN THIRD TRIMESTER: Primary | ICD-10-CM

## 2018-01-22 DIAGNOSIS — K21.9 GASTROESOPHAGEAL REFLUX DISEASE WITHOUT ESOPHAGITIS: ICD-10-CM

## 2018-01-22 DIAGNOSIS — Z98.891 HISTORY OF 3 CESAREAN SECTIONS: ICD-10-CM

## 2018-01-22 PROBLEM — Z23 NEED FOR TDAP VACCINATION: Status: RESOLVED | Noted: 2017-11-16 | Resolved: 2018-01-22

## 2018-01-22 PROCEDURE — 99207 ZZC COMPLICATED OB VISIT: CPT | Performed by: OBSTETRICS & GYNECOLOGY

## 2018-01-22 NOTE — TELEPHONE ENCOUNTER
They are going to work on trying to get her transport because I am not able to reschedule her.    They will also look into the procedure for the day of surgery so we can inform the hospital.

## 2018-01-22 NOTE — MR AVS SNAPSHOT
"              After Visit Summary   2018    Blanca Bello    MRN: [id number]           Patient Information     Date Of Birth          1985        Visit Information        Provider Department      2018 11:00 AM Sandra Dominguez MD [facility]        Today's Diagnoses     Dichorionic diamniotic twin pregnancy in third trimester    -  1    History of 3  sections        Gastroesophageal reflux disease without esophagitis           Follow-ups after your visit        Follow-up notes from your care team     Return in about 2 weeks (around 2018) for OB Visit.      Your next 10 appointments already scheduled     Mar 01, 2018  1:15 PM CST   ESTABLISHED PRENATAL with [facility]inguez MD   [facility]    [address] [facility] [address] [phone]              Who to contact     If you have questions or need follow up information about today's clinic visit or your schedule please contact [facility] directly at [phone].  Normal or non-critical lab and imaging results will be communicated to you by CLINICAHEALTHhart, letter or phone within 4 business days after the clinic has received the results. If you do not hear from us within 7 days, please contact the clinic through [email or web address] or phone. If you have a critical or abnormal lab result, we will notify you by phone as soon as possible.  Submit refill requests through Finestrella or call your pharmacy and they will forward the refill request to us. Please allow 3 business days for your refill to be completed.          Additional Information About Your Visit        CLINICAHEALTHharPrylos Information     Finestrella lets you send messages to your doctor, view your test results, renew your prescriptions, schedule appointments and more. To sign up, go to [email or web address] . Click on \"Log in\" on the left side of the screen, which will take you to the Welcome page. Then click on \"Sign up " "Now\" on the right side of the page.     You will be asked to enter the access code listed below, as well as some personal information. Please follow the directions to create your username and password.     Your access code is: [email or web address]  Expires: 2018  6:21 PM     Your access code will  in 90 days. If you need help or a new code, please call your [facility] or [phone].        Care EveryWhere ID     This is your Care EveryWhere ID. This could be used by other organizations to access your [place] medical records  [id number]        Your Vitals Were     Pulse BMI (Body Mass Index)                70 48.38 kg/m2           Blood Pressure from Last 3 Encounters:   18 130/80   18 110/80   17 110/70    Weight from Last 3 Encounters:   18 288 lb 8 oz (130.9 kg)   18 291 lb 8 oz (132.2 kg)   17 293 lb 4 oz (133 kg)              Today, you had the following     No orders found for display         Today's Medication Changes          These changes are accurate as of: 18 11:12 AM.  If you have any questions, ask your nurse or doctor.               Start taking these medicines.        Dose/Directions    ranitidine 150 MG tablet   Commonly known as:  ZANTAC   Used for:  Gastroesophageal reflux disease without esophagitis   Started by:  Sandra Dominguez MD        Dose:  150 mg   Take 1 tablet (150 mg) by mouth 2 times daily   Quantity:  60 tablet   Refills:  1            Where to get your medicines      Some of these will need a paper prescription and others can be bought over the counter.  Ask your nurse if you have questions.     Bring a paper prescription for each of these medications     ranitidine 150 MG tablet                Primary Care Provider Office Phone # Fax #    [facility] [phone]       [address] [place] [address]        Equal Access to Services     ALFONSO GIANG AH: shirley Izquierdo " zachary garciamatesfaye cookfreddietwila carlinmiguel nayzeus greene. So [facility] [phone].    ATENCIÓN: Si omer green, tiene a newman disposición servicios gratuitos de asistencia lingüística. Rhonda al [phone].    We comply with applicable federal civil rights laws and [place] laws. We do not discriminate on the basis of race, color, national origin, age, disability, sex, sexual orientation, or gender identity.            Thank you!     Thank you for choosing [facility]  for your care. Our goal is always to provide you with excellent care. Hearing back from our patients is one way we can continue to improve our services. Please take a few minutes to complete the written survey that you may receive in the mail after your visit with us. Thank you!             Your Updated Medication List - Protect others around you: Learn how to safely use, store and throw away your medicines at www.disposemymeds.org.          This list is accurate as of: 1/22/18 11:12 AM.  Always use your most recent med list.                   Brand Name Dispense Instructions for use Diagnosis    prenatal multivitamin plus iron 27-0.8 MG Tabs per tablet      Take 1 tablet by mouth daily        ranitidine 150 MG tablet    ZANTAC    60 tablet    Take 1 tablet (150 mg) by mouth 2 times daily    Gastroesophageal reflux disease without esophagitis

## 2018-01-22 NOTE — TELEPHONE ENCOUNTER
Reason for Call:  Other     Detailed comments: jeanette calling from [facility] needs to speak with vicky or nurse about C Section , wants to change the date and time. Jeanette states was to go through [facility] to schedule and that date and time doesn't work for transport. Please contact Jeanette at [phone]    Phone Number Patient can be reached at: Other phone number:  [phone]*    Best Time: ANY    Can we leave a detailed message on this number? YES    Call taken on 1/22/2018 at 9:32 AM by Mikayla Parks

## 2018-01-22 NOTE — PROGRESS NOTES
Presents for routine  appointment.     No complaints aside from some heart burn.    No LOF/VB/Ctxs.    ROS:   and GI  negative.     Please see Prenatal Vitals and Notes Flowsheet for objective data.    A/P:  33 year old  at 32w2d       ICD-10-CM    1. Dichorionic diamniotic twin pregnancy in third trimester O30.043    2. History of 3  sections Z87.59    3. Gastroesophageal reflux disease without esophagitis K21.9 ranitidine (ZANTAC) 150 MG tablet       Tdap given at previous visit   Follow up in 2 weeks.      Sandra Dominguez MD

## 2018-01-22 NOTE — NURSING NOTE
"Chief Complaint   Patient presents with     Prenatal Care       Initial /80 (BP Location: Right arm, Patient Position: Chair, Cuff Size: Adult Regular)  Pulse 70  Wt 288 lb 8 oz (130.9 kg)  BMI 48.38 kg/m2 Estimated body mass index is 48.38 kg/(m^2) as calculated from the following:    Height as of 11/16/17: 5' 4.75\" (1.645 m).    Weight as of this encounter: 288 lb 8 oz (130.9 kg).  Medication Reconciliation: complete     Parvin Winters, [facility]  January 22, 2018      "

## 2018-01-31 ENCOUNTER — TRANSFERRED RECORDS (OUTPATIENT)
Dept: HEALTH INFORMATION MANAGEMENT | Facility: CLINIC | Age: 33
End: 2018-01-31

## 2018-01-31 ENCOUNTER — TELEPHONE (OUTPATIENT)
Dept: OBGYN | Facility: OTHER | Age: 33
End: 2018-01-31

## 2018-01-31 NOTE — TELEPHONE ENCOUNTER
Contacted [facility] and spoke with Melissa.  I went over all questions with her she was able to let me know that yes they have arranged transportation for surgery on 03/07/18.  There will be two deputies that will transport the patient to the hospital and two that will remain with her at all times during her stay.  Upon discharge she will be returning to [facility] by transport of two deputies.  The patient is not to have any contact with her family members during her stay (she was unsure if this included the babies).  Melissa stated the remainder of the questions could probably be answered via the patients .  Melissa stated she was under the assumption that Xin Hendrickson were in contact with the  to arrange for the babies to be released to the patients family members at discharge but she didn't have specific details.

## 2018-01-31 NOTE — TELEPHONE ENCOUNTER
I talked with Leah Graham RN, the  nurse navigator at [facility].  She talked with the  and they have some questions.      1) Which county does (did[place] she live in?   2) Where are her other children living and was Child Protection Services involved?   3) What is the plan for these babies at the time of discharge? (i.e. where will they go from the hospital?)   4) What is the plan for patient s discharge? (where will she be discharged to? Who will transport her?)   5) While in the hospital, will patient be allowed access to her babies? Can they be in the room with her?   6) What was the patient s conviction for  any history of violence, concern for care of her other children or drug use?   7) How will she get to the hospital for her delivery?   8) Will there be security with her while she is at the hospital?     Please call the [facility] and see if they are able to answer any of these questions.  Please also make sure they have arranged transportation for surgery 3/7/18.

## 2018-02-01 PROBLEM — O30.042 DICHORIONIC DIAMNIOTIC TWIN PREGNANCY IN SECOND TRIMESTER: Status: ACTIVE | Noted: 2017-11-16

## 2018-02-01 NOTE — TELEPHONE ENCOUNTER
Received correspondence from the patient's .  The  granted the motion to modify release conditions.  She believes she is going to be released to a long-term house and allowed to make her appointments on her own.  The  feels she could be treated like any other patient.      We will have to confirm that the long-term house will except the patient with babies.  Leah Graham,  nurse navigator at [facility], was updated.

## 2018-02-07 ENCOUNTER — TRANSFERRED RECORDS (OUTPATIENT)
Dept: HEALTH INFORMATION MANAGEMENT | Facility: CLINIC | Age: 33
End: 2018-02-07

## 2018-02-12 ENCOUNTER — PRENATAL OFFICE VISIT (OUTPATIENT)
Dept: OBGYN | Facility: OTHER | Age: 33
End: 2018-02-12
Payer: COMMERCIAL

## 2018-02-12 VITALS
WEIGHT: 293 LBS | HEART RATE: 76 BPM | DIASTOLIC BLOOD PRESSURE: 80 MMHG | SYSTOLIC BLOOD PRESSURE: 128 MMHG | BODY MASS INDEX: 49.85 KG/M2

## 2018-02-12 DIAGNOSIS — Z98.891 HISTORY OF 3 CESAREAN SECTIONS: ICD-10-CM

## 2018-02-12 DIAGNOSIS — O30.043 DICHORIONIC DIAMNIOTIC TWIN PREGNANCY IN THIRD TRIMESTER: Primary | ICD-10-CM

## 2018-02-12 PROCEDURE — 99207 ZZC COMPLICATED OB VISIT: CPT | Performed by: OBSTETRICS & GYNECOLOGY

## 2018-02-12 NOTE — NURSING NOTE
"Chief Complaint   Patient presents with     Prenatal Care       Initial /80 (BP Location: Right arm, Patient Position: Chair, Cuff Size: Adult Regular)  Pulse 76  Wt 297 lb 4 oz (134.8 kg)  BMI 49.85 kg/m2 Estimated body mass index is 49.85 kg/(m^2) as calculated from the following:    Height as of 11/16/17: 5' 4.75\" (1.645 m).    Weight as of this encounter: 297 lb 4 oz (134.8 kg).  Medication Reconciliation: complete     Parvin Winters, CMA  February 12, 2018      "

## 2018-02-12 NOTE — PROGRESS NOTES
Presents for routine  appointment.    The ranitidine is working ok, but the am dose makes her nauseated so she is skipping it.    No LOF/VB/Ctxs.    ROS:   and GI  negative.     Please see Prenatal Vitals and Notes Flowsheet for objective data.    A/P:  33 year old  at 35w2d       ICD-10-CM    1. Dichorionic diamniotic twin pregnancy in third trimester O30.043    2. History of 3  sections Z87.59        Reportable signs and symptoms discussed  Group B Strep next visit.   Preop next visit.   Continue weekly BPP with MFM  Repeat CS scheduled 3/7 at 930 when she is 38/4.  She should be in the half way house soon.   She should be able to have the babies in the half way house.  She is not sure how much help she will have in the half way house.    Follow up next week with MFM.  Two weeks with me.          Sandra Dominguez MD

## 2018-02-12 NOTE — MR AVS SNAPSHOT
"              After Visit Summary   2018    Blanca Bello    MRN: [id number]           Patient Information     Date Of Birth          1985        Visit Information        Provider Department      2018 3:00 PM Sandra Dominguez MD [facility]        Today's Diagnoses     Dichorionic diamniotic twin pregnancy in third trimester    -  1    History of 3  sections           Follow-ups after your visit        Follow-up notes from your care team     Return in about 2 weeks (around 2018) for OB Visit.      Your next 10 appointments already scheduled     Mar 01, 2018  1:15 PM CST   ESTABLISHED PRENATAL with Sandra Dominguez MD   [facility]    [address] [facility] [address] [phone]              Who to contact     If you have questions or need follow up information about today's clinic visit or your schedule please contact [facility] directly at [phone].  Normal or non-critical lab and imaging results will be communicated to you by Changohart, letter or phone within 4 business days after the clinic has received the results. If you do not hear from us within 7 days, please contact the clinic through [employer] or phone. If you have a critical or abnormal lab result, we will notify you by phone as soon as possible.  Submit refill requests through [employer] or call your pharmacy and they will forward the refill request to us. Please allow 3 business days for your refill to be completed.          Additional Information About Your Visit        MyChart Information     Deem lets you send messages to your doctor, view your test results, renew your prescriptions, schedule appointments and more. To sign up, go to [email or web address] . Click on \"Log in\" on the left side of the screen, which will take you to the Welcome page. Then click on \"Sign up Now\" on the right side of the page.     You will be asked to " enter the access code listed below, as well as some personal information. Please follow the directions to create your username and password.     Your access code is: [email or web address]  Expires: 2018  6:21 PM     Your access code will  in 90 days. If you need help or a new code, please call your [facility] clinic or [phone].        Care EveryWhere ID     This is your Care EveryWhere ID. This could be used by other organizations to access your [facility] medical records  [id number]        Your Vitals Were     Pulse BMI (Body Mass Index)                76 49.85 kg/m2           Blood Pressure from Last 3 Encounters:   18 128/80   18 130/80   18 110/80    Weight from Last 3 Encounters:   18 297 lb 4 oz (134.8 kg)   18 288 lb 8 oz (130.9 kg)   18 291 lb 8 oz (132.2 kg)              Today, you had the following     No orders found for display       Primary Care Provider Office Phone # Fax #    [facility] [phone]       [address] [facility] [address]        Equal Access to Services     ALFONSO GIANG AH: Hadii aad ku hadasho Sofelipaali, waaxda luqadaha, qaybta kaalmada adeegyada, zia greene. So [facility] [phone].    ATENCIÓN: Si habla español, tiene a newman disposición servicios gratuitos de asistencia lingüística. [facility] [phone].    We comply with applicable federal civil rights laws and [place] laws. We do not discriminate on the basis of race, color, national origin, age, disability, sex, sexual orientation, or gender identity.            Thank you!     Thank you for choosing [facility]  for your care. Our goal is always to provide you with excellent care. Hearing back from our patients is one way we can continue to improve our services. Please take a few minutes to complete the written survey that you may receive in the mail after your visit with us. Thank you!             Your Updated  Medication List - Protect others around you: Learn how to safely use, store and throw away your medicines at www.disposemymeds.org.          This list is accurate as of 2/12/18  3:10 PM.  Always use your most recent med list.                   Brand Name Dispense Instructions for use Diagnosis    prenatal multivitamin plus iron 27-0.8 MG Tabs per tablet      Take 1 tablet by mouth daily        ranitidine 150 MG tablet    ZANTAC    60 tablet    Take 1 tablet (150 mg) by mouth 2 times daily    Gastroesophageal reflux disease without esophagitis

## 2018-02-14 ENCOUNTER — TRANSFERRED RECORDS (OUTPATIENT)
Dept: HEALTH INFORMATION MANAGEMENT | Facility: CLINIC | Age: 33
End: 2018-02-14

## 2018-02-27 ENCOUNTER — TRANSFERRED RECORDS (OUTPATIENT)
Dept: HEALTH INFORMATION MANAGEMENT | Facility: CLINIC | Age: 33
End: 2018-02-27

## 2018-02-28 ENCOUNTER — TELEPHONE (OUTPATIENT)
Dept: OBGYN | Facility: OTHER | Age: 33
End: 2018-02-28

## 2018-02-28 NOTE — TELEPHONE ENCOUNTER
Reason for Call:  Other     Detailed comments: Elda Roblero  from [employer] calling states was asked by María Florez to call clinic to speak to Sandra Alberto. Elda Roblero states María Florez is [place] and had received a secure email from [employer] but was unable to get message due to had no password. Elda Roblero states calling to follow up . Please contact Elda Roblero at [phone]    Phone Number Patient can be reached at: Home number on file [phone] (home)    Best Time: ANY    Can we leave a detailed message on this number? YES    Call taken on 2/28/2018 at 9:12 AM by Mikayla Parks

## 2018-04-27 ENCOUNTER — TELEPHONE (OUTPATIENT)
Dept: OBGYN | Facility: OTHER | Age: 33
End: 2018-04-27

## 2018-04-27 NOTE — TELEPHONE ENCOUNTER
Re: Patient is overdue for postpartum colposcopy.     Reminder call - patient is in half-way house. Consent to communicate with , María [employer], [phone], dated 1/8/18.  Spoke to María. María will contact patient and have her schedule her colposcopy.   Please assist patient to schedule colposcopy with Dr. Dominguez when she calls back.   Thank you,   Addis Riley, DOTTIEN, RN, Pap Tracking Nurse

## 2019-02-25 ENCOUNTER — TELEPHONE (OUTPATIENT)
Dept: OBGYN | Facility: OTHER | Age: 34
End: 2019-02-25

## 2019-02-25 NOTE — TELEPHONE ENCOUNTER
Pt is past due for f/u pap smear if declining recommended colposcopy.  [facility] clinic and schedule.  Rhianna Torres,    Pap Tracking

## 2019-03-26 PROBLEM — R87.810 CERVICAL HIGH RISK HPV (HUMAN PAPILLOMAVIRUS) TEST POSITIVE: Status: ACTIVE | Noted: 2017-11-16
